# Patient Record
Sex: MALE | Race: WHITE
[De-identification: names, ages, dates, MRNs, and addresses within clinical notes are randomized per-mention and may not be internally consistent; named-entity substitution may affect disease eponyms.]

---

## 2019-07-01 PROBLEM — Z00.00 ENCOUNTER FOR PREVENTIVE HEALTH EXAMINATION: Status: ACTIVE | Noted: 2019-07-01

## 2022-04-09 ENCOUNTER — RESULT REVIEW (OUTPATIENT)
Age: 57
End: 2022-04-09

## 2022-05-13 ENCOUNTER — APPOINTMENT (OUTPATIENT)
Dept: ORTHOPEDIC SURGERY | Facility: CLINIC | Age: 57
End: 2022-05-13
Payer: COMMERCIAL

## 2022-05-13 VITALS — BODY MASS INDEX: 24.38 KG/M2 | HEIGHT: 74 IN | WEIGHT: 190 LBS

## 2022-05-13 DIAGNOSIS — Z78.9 OTHER SPECIFIED HEALTH STATUS: ICD-10-CM

## 2022-05-13 PROCEDURE — 99213 OFFICE O/P EST LOW 20 MIN: CPT

## 2022-05-13 NOTE — HISTORY OF PRESENT ILLNESS
[de-identified] : Follow up on MRI results on the right elbow today. Feeling slightly better but was only seen back in March and had some issues with the MRI being approved.

## 2022-05-13 NOTE — WORK
[Light Work:] : Light Work: Exerting up to 20 pounds of force occasionally, and/or up to 10 pounds of force frequently and/or negligible amount of force constantly to move objects. Physical demand requirements are in excess of those for Sedentary Work. Even though the weight lifted may only be a negligible amount, a job should be rated Light Work: (1) when it requires walking or standing to a significant degree: or (2) when it requires sitting most of the time but entails pushing and/or pulling of arm or leg controls: and/or (3) when the job requires working at a production rate pace entailing the constant pushing and/or pulling of materials even though the weight of those materials is negligible. NOTE: The constant stress of maintaining a production rate pace, especially in an industrial setting, can be and is physically demanding of a worker even though the amount of force exerted is negligible. 98.8

## 2022-05-13 NOTE — ASSESSMENT
[FreeTextEntry1] : REVIEWED ELBOW MRI, HIGH GRADE PARTIAL TEAR MEDIALLY\par ADVISED LIGHT DUTY, AVOID LIFTING OVER 10 LBS\par PT/HEP\par DISCUSSED CSI NEXT TIME IF PERSISTS

## 2022-05-13 NOTE — IMAGING
[de-identified] : Right elbow No swelling, no ecchymosis\par Tenderness to palpation over medial epicondyle\par Full elbow flexion, extension, supination, pronation\par 4/5 strength in flexion, extension, supination, pronation\par Medial Elbow pain with resisted wrist flexion\par Negative tinel over elbow\par Negative milking test\par No Varus or Valgus instability\par Motor and sensory intact distally\par

## 2022-08-26 ENCOUNTER — APPOINTMENT (OUTPATIENT)
Dept: ORTHOPEDIC SURGERY | Facility: CLINIC | Age: 57
End: 2022-08-26

## 2022-08-26 VITALS — BODY MASS INDEX: 24.38 KG/M2 | WEIGHT: 190 LBS | HEIGHT: 74 IN

## 2022-08-26 PROCEDURE — 99213 OFFICE O/P EST LOW 20 MIN: CPT

## 2022-08-26 NOTE — IMAGING
[de-identified] : No swelling, no ecchymosis\par Tenderness to palpation over medial epicondyle\par Full elbow flexion, extension, supination, pronation\par 5/5 strength in flexion, extension, supination, pronation\par Medial Elbow pain with resisted wrist flexion\par Negative tinel over elbow\par Negative milking test\par No Varus or Valgus instability\par Motor and sensory intact distally\par

## 2022-08-26 NOTE — HISTORY OF PRESENT ILLNESS
[de-identified] : 5/13/22: Follow up on MRI results on the right elbow today. Feeling slightly better but was only seen back in March and had some issues with the MRI being approved. \par \par 8/26/22: [FreeTextEntry1] : RT ELBOW [FreeTextEntry5] : FEELING BETTER WITH THE HEP, STILL HAS SOME PAIN WITH ACTIVITY

## 2023-02-24 ENCOUNTER — APPOINTMENT (OUTPATIENT)
Dept: ORTHOPEDIC SURGERY | Facility: CLINIC | Age: 58
End: 2023-02-24
Payer: COMMERCIAL

## 2023-02-24 ENCOUNTER — NON-APPOINTMENT (OUTPATIENT)
Age: 58
End: 2023-02-24

## 2023-02-24 VITALS — WEIGHT: 190 LBS | BODY MASS INDEX: 24.38 KG/M2 | HEIGHT: 74 IN

## 2023-02-24 DIAGNOSIS — M77.01 MEDIAL EPICONDYLITIS, RIGHT ELBOW: ICD-10-CM

## 2023-02-24 DIAGNOSIS — M75.41 IMPINGEMENT SYNDROME OF RIGHT SHOULDER: ICD-10-CM

## 2023-02-24 PROCEDURE — 99213 OFFICE O/P EST LOW 20 MIN: CPT

## 2023-02-24 NOTE — IMAGING
[de-identified] : No swelling, no ecchymosis, no skyla deformity\par Tenderness to palpation over greater tuberosity\par No instability or tenderness over AC joint\par 175/80/60/40\par 5/5 supraspinatus, infraspinatus and subscapularis; there is pain with strength testing\par Positive Ramos test, positive impingement sign\par Speed’s and yergason negative\par Motor and sensory intact distally\par \par No swelling, no ecchymosis\par Tenderness to palpation over medial epicondyle\par Full elbow flexion, extension, supination, pronation\par 5/5 strength in flexion, extension, supination, pronation\par Medial Elbow pain with resisted wrist flexion\par Negative tinel over elbow\par Negative milking test\par No Varus or Valgus instability\par Motor and sensory intact distally\par \par

## 2023-02-24 NOTE — HISTORY OF PRESENT ILLNESS
[7] : 7 [0] : 0 [Localized] : localized [Intermittent] : intermittent [Rest] : rest [de-identified] : 5/13/22: Follow up on MRI results on the right elbow today. Feeling slightly better but was only seen back in March and had some issues with the MRI being approved. \par \par 8/26/22:\par \par 02/24/23 here for a follow up on the right elbow ,feeling better pain only when lifting [FreeTextEntry1] : RT ELBOW [FreeTextEntry5] : FEELING BETTER WITH THE HEP, STILL HAS SOME PAIN WITH ACTIVITY [de-identified] : lifttin [de-identified] : nothing

## 2023-02-24 NOTE — ASSESSMENT
[FreeTextEntry1] : AT THIS POINT DONT EXPECT HIS CONDITION TO IMPROVE ANY FURTHER\par HE REPORTS BEING ABLE TO WORK WITH THE CURRENT LIMITATIONS/LIGHT DUTY; AGAIN,  EXPECT THESE LIMITATIONS TO BE PERMANENT. \par F/U PRN

## 2024-07-15 ENCOUNTER — APPOINTMENT (OUTPATIENT)
Dept: ORTHOPEDIC SURGERY | Facility: CLINIC | Age: 59
End: 2024-07-15
Payer: COMMERCIAL

## 2024-07-15 VITALS — BODY MASS INDEX: 23.74 KG/M2 | HEIGHT: 74 IN | WEIGHT: 185 LBS

## 2024-07-15 DIAGNOSIS — M23.92 UNSPECIFIED INTERNAL DERANGEMENT OF LEFT KNEE: ICD-10-CM

## 2024-07-15 DIAGNOSIS — M23.91 UNSPECIFIED INTERNAL DERANGEMENT OF RIGHT KNEE: ICD-10-CM

## 2024-07-15 PROCEDURE — 99214 OFFICE O/P EST MOD 30 MIN: CPT

## 2024-07-15 PROCEDURE — 73564 X-RAY EXAM KNEE 4 OR MORE: CPT | Mod: 50

## 2024-07-24 ENCOUNTER — RESULT REVIEW (OUTPATIENT)
Age: 59
End: 2024-07-24

## 2024-07-29 ENCOUNTER — APPOINTMENT (OUTPATIENT)
Dept: ORTHOPEDIC SURGERY | Facility: CLINIC | Age: 59
End: 2024-07-29
Payer: COMMERCIAL

## 2024-07-29 DIAGNOSIS — M22.2X2 PATELLOFEMORAL DISORDERS, LEFT KNEE: ICD-10-CM

## 2024-07-29 DIAGNOSIS — M23.306 OTHER MENISCUS DERANGEMENTS, UNSPECIFIED MENISCUS, RIGHT KNEE: ICD-10-CM

## 2024-07-29 DIAGNOSIS — M17.11 UNILATERAL PRIMARY OSTEOARTHRITIS, RIGHT KNEE: ICD-10-CM

## 2024-07-29 DIAGNOSIS — M17.12 UNILATERAL PRIMARY OSTEOARTHRITIS, LEFT KNEE: ICD-10-CM

## 2024-07-29 PROCEDURE — 99214 OFFICE O/P EST MOD 30 MIN: CPT

## 2024-07-29 RX ORDER — MELOXICAM 15 MG/1
15 TABLET ORAL DAILY
Qty: 30 | Refills: 1 | Status: ACTIVE | COMMUNITY
Start: 2024-07-29 | End: 2024-09-27

## 2024-07-29 NOTE — HISTORY OF PRESENT ILLNESS
[6] : 6 [Sharp] : sharp [Intermittent] : intermittent [Rest] : rest [Standing] : standing [Walking] : walking [de-identified] : 7.15.24 Patient here for roland knee pain. Left knee started about a year ago, no injury. Pain eventually went away, but has returned. The right knee started about a month ago. Reports episodes of instability on b/l knees.   7.29.24 Patient here for roland knee pain. Here to review MRI from [facility]

## 2024-07-29 NOTE — ASSESSMENT
[FreeTextEntry1] : Reviewed bilateral knee MRI in detail.  Left side with some quadriceps tendinitis and degeneration but no tearing of the meniscus.  The right side does have some mild OA with associated horizontal tear in the meniscus.  Explained degenerative in nature.  He is no longer having any mechanical symptoms.  Will try anti-inflammatory and course of physical therapy.  If no improvement consider corticosteroid injection eventual scope for the right side.

## 2024-07-29 NOTE — IMAGING
[de-identified] : Knee Exam: Inspection: No effusion, no warmth, no ecchymosis Palpation: Medial joint line tenderness to palpation, negative Luisana Range of motion: 0-130 with mild anterior crepitus Strength: 5/5 quadriceps and hamstring strength Stability: Ligamentously stable Motor and sensory intact distally Gait: Non antalgic gait

## 2024-09-09 ENCOUNTER — APPOINTMENT (OUTPATIENT)
Dept: ORTHOPEDIC SURGERY | Facility: CLINIC | Age: 59
End: 2024-09-09
Payer: COMMERCIAL

## 2024-09-09 VITALS — BODY MASS INDEX: 23.74 KG/M2 | HEIGHT: 74 IN | WEIGHT: 185 LBS

## 2024-09-09 DIAGNOSIS — M17.11 UNILATERAL PRIMARY OSTEOARTHRITIS, RIGHT KNEE: ICD-10-CM

## 2024-09-09 DIAGNOSIS — M23.306 OTHER MENISCUS DERANGEMENTS, UNSPECIFIED MENISCUS, RIGHT KNEE: ICD-10-CM

## 2024-09-09 DIAGNOSIS — M22.2X2 PATELLOFEMORAL DISORDERS, LEFT KNEE: ICD-10-CM

## 2024-09-09 DIAGNOSIS — S86.811A STRAIN OF OTHER MUSCLE(S) AND TENDON(S) AT LOWER LEG LEVEL, RIGHT LEG, INITIAL ENCOUNTER: ICD-10-CM

## 2024-09-09 PROCEDURE — 99213 OFFICE O/P EST LOW 20 MIN: CPT

## 2024-09-09 RX ORDER — MELOXICAM 15 MG/1
15 TABLET ORAL
Qty: 30 | Refills: 0 | Status: ACTIVE | COMMUNITY
Start: 2024-09-09 | End: 1900-01-01

## 2024-09-09 NOTE — ASSESSMENT
[FreeTextEntry1] : Clinically improving bilateral knee pain, but now with right calf strain. TTP over medial gastroc. He reports improvement since DOI, but still symptomatic. Advised course of PT for right calf and both knees. Meloxicam 15mg sent to pharmacy today. Discussed use of heating pad over calf. RTC 6 weeks for re-eval   I am working today under the supervision of Dr. Mckoy and I am following the plan of care of Dr. Mckoy as described by him on this date.  Progress note completed by Nissa Ramírez PA-C under the supervision of Dr. Mckoy.   The patient's current medication management of their orthopedic diagnosis was reviewed today: (1) We discussed a comprehensive treatment plan that included pharmaceutical management involving the use of prescription medications. (2) There is a moderate risk of morbidity with further treatment, especially from use of prescription strength medications and possible side effects of these medications which include upset stomach with oral medications, skin reactions to topical medications and cardiac/renal/diabetes issues with long term use. (3) I recommended that the patient follow-up with their medical physician to discuss any significant specific potential issues with long term medication use such as interactions with current medications or with exacerbation of underlying medical comorbidities. (4) The benefits and risks associated with use of injectable, oral or topical, prescription and over the counter anti-inflammatory medications were discussed with the patient. The patient voiced understanding of the risks including but not limited to bleeding, stroke, kidney dysfunction, heart disease, and were referred to the black box warning label for further information.

## 2024-09-09 NOTE — IMAGING
[de-identified] : Calf Exam:  Inspection: Mild diffuse calf and ankle swelling, no ecchymosis Palpation: TTP medial head gastroc; no tenderness over Achilles tendon, ATFL or deltoid ROM: Dorsiflexion 20, plantar flexion 40, inversion 30, eversion 20 Strength: 5/5 plantar flexion with pain, 5/5 dorsiflexion, eversion, inversion Special testing: Negative drawer, no pain with single heel raise. Motor and sensory intact distally Vascular exam: +2 DP and PT pulses Negative maximo Gait: Mild antalgic gait

## 2024-09-09 NOTE — HISTORY OF PRESENT ILLNESS
[6] : 6 [Sharp] : sharp [Intermittent] : intermittent [Rest] : rest [Standing] : standing [Walking] : walking [de-identified] : 7.15.24 Patient here for roland knee pain. Left knee started about a year ago, no injury. Pain eventually went away, but has returned. The right knee started about a month ago. Reports episodes of instability on b/l knees.   7.29.24 Patient here for roland knee pain. Here to review MRI from [facility]  09.09.2024: knee pain is improving, wants to discuss about calf pain. Reports he felt a pop in his calf while walking out of PT two weeks ago. . Reports improvement since initial DOI, been icing/anti-inflammatories.

## 2024-10-21 ENCOUNTER — APPOINTMENT (OUTPATIENT)
Dept: ORTHOPEDIC SURGERY | Facility: CLINIC | Age: 59
End: 2024-10-21